# Patient Record
(demographics unavailable — no encounter records)

---

## 2024-11-10 NOTE — PHYSICAL EXAM
[de-identified] : R SF + swelling  FDS and FDP intact Full ROM and no malrotation however painful with terminal flexion  SITLT [de-identified] : Patient comes to today's visit with outside imaging already performed.  I reviewed the images in detail with the patient and discussed the findings as highlighted below.   X-ray - 5 views of the right hand and fifth finger done 11/6/24 at [facility] Radiology independently reviewed today show a Salter Espinal type 2 P1 small finger compression injury at the ulnar base.

## 2024-11-10 NOTE — ASSESSMENT
[FreeTextEntry1] : 9F here with a x 1 week Salter Espinal type 2 P1 small finger compression injury at the ulnar base.  The nature of the condition and conservative treatment were discussed with the patient and her mother. She was advised to avoid gym and sports. Toby straps provided today. She may wish to use the ulnar gutter brace provided today for the next week for extra support. Follow up 3 weeks for repeat Xrays and ROM check.

## 2024-11-10 NOTE — PHYSICAL EXAM
[de-identified] : R SF + swelling  FDS and FDP intact Full ROM and no malrotation however painful with terminal flexion  SITLT [de-identified] : Patient comes to today's visit with outside imaging already performed.  I reviewed the images in detail with the patient and discussed the findings as highlighted below.   X-ray - 5 views of the right hand and fifth finger done 11/6/24 at [facility] Radiology independently reviewed today show a Salter Espinal type 2 P1 small finger compression injury at the ulnar base.

## 2024-11-10 NOTE — PHYSICAL EXAM
[de-identified] : R SF + swelling  FDS and FDP intact Full ROM and no malrotation however painful with terminal flexion  SITLT [de-identified] : Patient comes to today's visit with outside imaging already performed.  I reviewed the images in detail with the patient and discussed the findings as highlighted below.   X-ray - 5 views of the right hand and fifth finger done 11/6/24 at [facility] Radiology independently reviewed today show a Salter Espinal type 2 P1 small finger compression injury at the ulnar base.

## 2024-11-10 NOTE — HISTORY OF PRESENT ILLNESS
[FreeTextEntry1] :  9 year old female presents with right small finger injury. She is here with her mother who is contributing to her history. Patient states she banged the finger on a metal pole on the playground 11/6/24. She had x-rays done at [facility] which showed a small fracture at the base of the small finger proximal phalanx. Has been in a splint since that time. Note signficant swelling of the finger. Pain is better with ice. Denies parasthesias.

## 2024-11-10 NOTE — HISTORY OF PRESENT ILLNESS
[FreeTextEntry1] :  9 year old female presents with right small finger injury. She is here with her mother who is contributing to her history. Patient states she banged the finger on a metal pole on the playground 11/6/24. She had x-rays done at [facility] which showed a small fracture at the base of the small finger proximal phalanx. Has been in a splint since that time. Note signficant swelling of the finger. Pain is better with ice. Denies parasthesias.

## 2024-11-10 NOTE — HISTORY OF PRESENT ILLNESS
[FreeTextEntry1] :  9 year old female presents with right small finger injury. She is here with her mother who is contributing to her history. Patient states she banged the finger on a metal pole on the playground 11/6/24. She had x-rays done at [facility] which showed a small fracture at the base of the small finger proximal phalanx. Has been in a splint since that time. Note signficant swelling of the finger. Pain is better with ice. Denies parasthesias.

## 2024-11-10 NOTE — PHYSICAL EXAM
[de-identified] : R SF + swelling  FDS and FDP intact Full ROM and no malrotation however painful with terminal flexion  SITLT [de-identified] : Patient comes to today's visit with outside imaging already performed.  I reviewed the images in detail with the patient and discussed the findings as highlighted below.   X-ray - 5 views of the right hand and fifth finger done 11/6/24 at [facility] Radiology independently reviewed today show a Salter Espinal type 2 P1 small finger compression injury at the ulnar base.

## 2024-11-10 NOTE — HISTORY OF PRESENT ILLNESS
[FreeTextEntry1] :  9 year old female presents with right small finger injury. She is here with her mother who is contributing to her history. Patient states she banged the finger on a metal pole on the playground 11/6/24. She had x-rays done at [facility] which showed a small fracture at the base of the small finger proximal phalanx. Has been in a splint since that time. Note signficant swelling of the finger. Pain is better with ice. Denies parasthesias.

## 2025-03-05 NOTE — REASON FOR VISIT
[Initial Evaluation] : an initial evaluation [Patient] : patient [Mother] : mother [FreeTextEntry1] : toe walking and tripping

## 2025-03-05 NOTE — ASSESSMENT
[FreeTextEntry1] : ALFREDO is a 10 year old F with habitual toe walking.   Today's visit included obtaining the history from the child and parent, due to the child's age, the child could not be considered a reliable historian, requiring the parent to act as an independent historian. The condition, natural history, and prognosis were explained to the patient and family. The clinical findings and images were reviewed with the family.   Toe-walking may have several different etiologies. In general, a heel-toe pattern develops on average 4-5 months after independent gait has been established or by age 2 years. Occasional toe walking is not uncommon in children who are learning to walk. But persistent toe walking past 2 years will warrant further investigation. It can be the first sign of an underlying neuromuscular or developmental abnormalities like CP, Duchenne muscular dystrophy, and autism. It may also be secondary to a contracture the achilles tendon.   Idiopathic toe-walking is described as bilateral persistent toe walking with or without a fixed equinus contracture, without other underlying abnormalities in patients > 2 years. Dynamic toe walking may be seen in children who seem to prefer to toe walk despite the ability to get the foot flat. The reason for this is unknown but postulated to be due to defects in sensory processing caused by the lack of barefoot walking by children. Idiopathic toe-walking may also have a genetic component.   Treatment starts with conservative measures. This includes observation in patients < 2 years as idiopathic toe walking may be a part of normal maturation of gait. Other options include physical therapy to stretch the achilles tendon. Braces and night splints may also be used to stretch the achilles. And a below-knee walking cast can be used to help stretch the calf. More severe cases may warrant chemodenervation of the gastroc-soleus complex with botox followed by serial casting. Surgical management would include lengthening of the achilles tendon but this is only indicated if there is a true equinus contracture.  Clinically she has no evidence of contractures. Recommendation is for continued verbal cues and home stretching exercises. A script for PT was provided today as well.  I am happy to see ALFREDO if there are any concerns or anytime a problem arises in the future.   All questions were answered, the family expresses understanding and agrees with the plan of care. All questions were answered, the family expresses understanding and agrees with the plan of care.  This note was generated using Dragon medical dictation software. A reasonable effort has been made for proofreading its contents, but typos may still remain. If there are any questions or points of clarification needed please do not hesitate to contact my office.

## 2025-03-05 NOTE — HISTORY OF PRESENT ILLNESS
[FreeTextEntry1] : ALFREDO is a 10 year old F who presents for evaluation of toe walking.  She is brought in today by her mom.  Her mom reports that she began walking at 8 months of age.  She only noticed toe walking around 5 to 6 years of age.  Mom says she walks 100% of the time on her toes.  She also complains of frequent falls due to toe walking.

## 2025-03-05 NOTE — PHYSICAL EXAM
[FreeTextEntry1] : Gait: Presents ambulating independently without signs of antalgia.  Good coordination and balance noted. Plantigrade foot with early heel rise. Neutral foot progression angle. GENERAL: Healthy appearing 10 year -old child. Alert, cooperative, in NAD SKIN: The skin is intact, warm, pink and dry over the area examined. EYES: Normal conjunctiva, normal eyelids and pupils were equal and round. ENT: normal ears, normal nose and normal lips. CARDIOVASCULAR: brisk capillary refill, but no peripheral edema. RESPIRATORY: The patient is in no apparent respiratory distress. They're taking full deep breaths without use of accessory muscles or evidence of audible wheezes or stridor without the use of a stethoscope. Normal respiratory effort. ABDOMEN: not examined MUSCULOSKELETAL:  BLE: Ankle dorsiflexion is 10 degrees on the right with both knee flexion and knee extension, ankle dorsiflexion is 15 degrees on the left with both knee flexion and extension.  Popliteal angles are 45 degrees on the right and 35 degrees on the left.